# Patient Record
Sex: FEMALE | URBAN - METROPOLITAN AREA
[De-identification: names, ages, dates, MRNs, and addresses within clinical notes are randomized per-mention and may not be internally consistent; named-entity substitution may affect disease eponyms.]

---

## 2018-09-26 ENCOUNTER — APPOINTMENT (RX ONLY)
Dept: URBAN - METROPOLITAN AREA CLINIC 12 | Facility: CLINIC | Age: 83
Setting detail: DERMATOLOGY
End: 2018-09-26

## 2018-09-26 DIAGNOSIS — L82.1 OTHER SEBORRHEIC KERATOSIS: ICD-10-CM

## 2018-09-26 DIAGNOSIS — L21.8 OTHER SEBORRHEIC DERMATITIS: ICD-10-CM

## 2018-09-26 PROBLEM — D48.5 NEOPLASM OF UNCERTAIN BEHAVIOR OF SKIN: Status: ACTIVE | Noted: 2018-09-26

## 2018-09-26 PROCEDURE — 99202 OFFICE O/P NEW SF 15 MIN: CPT | Mod: 25

## 2018-09-26 PROCEDURE — ? BIOPSY BY SHAVE METHOD AND DESTRUCTION

## 2018-09-26 PROCEDURE — 11100: CPT

## 2018-09-26 PROCEDURE — ? PRESCRIPTION

## 2018-09-26 PROCEDURE — ? COUNSELING

## 2018-09-26 RX ORDER — KETOCONAZOLE 20.5 MG/ML
SHAMPOO, SUSPENSION TOPICAL BIW
Qty: 1 | Refills: 3 | Status: ERX | COMMUNITY
Start: 2018-09-26

## 2018-09-26 RX ADMIN — KETOCONAZOLE: 20.5 SHAMPOO, SUSPENSION TOPICAL at 17:44

## 2018-09-26 ASSESSMENT — SEVERITY ASSESSMENT: HOW SEVERE IS THIS PATIENT'S CONDITION?: MODERATE

## 2018-09-26 ASSESSMENT — LOCATION DETAILED DESCRIPTION DERM: LOCATION DETAILED: SUPERIOR THORACIC SPINE

## 2018-09-26 ASSESSMENT — LOCATION SIMPLE DESCRIPTION DERM: LOCATION SIMPLE: UPPER BACK

## 2018-09-26 ASSESSMENT — LOCATION ZONE DERM: LOCATION ZONE: TRUNK

## 2018-09-26 NOTE — PROCEDURE: BIOPSY BY SHAVE METHOD AND DESTRUCTION
Notification Instructions: Patient will be notified of biopsy results. However, patient instructed to call the office if not contacted within 2 weeks.
Bill As?: Biopsy by Shave Method
Number Of Curettages: 3
Size Of Lesion After Curettage: 0
Bill For Surgical Tray: no
Anesthesia Volume In Cc: 1
Path Notes (To The Dermatopathologist): r/o Bcc
Detail Level: Detailed
Anesthesia Type: 1% lidocaine with epinephrine
Post-Care Instructions: I reviewed with the patient in detail post-care instructions. Patient is to keep the biopsy site dry overnight, use saline soaks twice daily for 2 weeks then daily until healed.   Apply Aquaphor twice daily after wound care and cover with dressing.  Written instructions were provided to patient.
Destruction Type: electrodesiccation
Biopsy Type: H and E
Consent: Written consent was obtained and risks were reviewed including but not limited to scarring, infection, bleeding, scabbing, incomplete removal, nerve damage and allergy to anesthesia.
Wound Care: Aquaphor
Billing Type: Third-Party Bill
Lab: [id number]
Hemostasis: Electrocautery

## 2018-10-24 ENCOUNTER — APPOINTMENT (RX ONLY)
Dept: URBAN - METROPOLITAN AREA CLINIC 12 | Facility: CLINIC | Age: 83
Setting detail: DERMATOLOGY
End: 2018-10-24

## 2018-10-24 DIAGNOSIS — L82.0 INFLAMED SEBORRHEIC KERATOSIS: ICD-10-CM

## 2018-10-24 DIAGNOSIS — Z85.828 PERSONAL HISTORY OF OTHER MALIGNANT NEOPLASM OF SKIN: ICD-10-CM

## 2018-10-24 DIAGNOSIS — L91.8 OTHER HYPERTROPHIC DISORDERS OF THE SKIN: ICD-10-CM

## 2018-10-24 PROBLEM — L29.8 OTHER PRURITUS: Status: ACTIVE | Noted: 2018-10-24

## 2018-10-24 PROBLEM — M12.9 ARTHROPATHY, UNSPECIFIED: Status: ACTIVE | Noted: 2018-10-24

## 2018-10-24 PROBLEM — E78.5 HYPERLIPIDEMIA, UNSPECIFIED: Status: ACTIVE | Noted: 2018-10-24

## 2018-10-24 PROBLEM — H91.90 UNSPECIFIED HEARING LOSS, UNSPECIFIED EAR: Status: ACTIVE | Noted: 2018-10-24

## 2018-10-24 PROBLEM — I10 ESSENTIAL (PRIMARY) HYPERTENSION: Status: ACTIVE | Noted: 2018-10-24

## 2018-10-24 PROCEDURE — 11100: CPT | Mod: 59

## 2018-10-24 PROCEDURE — ? BIOPSY BY SHAVE METHOD

## 2018-10-24 PROCEDURE — 17110 DESTRUCTION B9 LES UP TO 14: CPT

## 2018-10-24 PROCEDURE — ? BENIGN DESTRUCTION

## 2018-10-24 ASSESSMENT — LOCATION DETAILED DESCRIPTION DERM
LOCATION DETAILED: RIGHT SUPERIOR FRONTAL SCALP
LOCATION DETAILED: RIGHT CENTRAL FRONTAL SCALP
LOCATION DETAILED: RIGHT PROXIMAL POSTERIOR THIGH

## 2018-10-24 ASSESSMENT — LOCATION SIMPLE DESCRIPTION DERM
LOCATION SIMPLE: SCALP
LOCATION SIMPLE: RIGHT POSTERIOR THIGH
LOCATION SIMPLE: SCALP

## 2018-10-24 ASSESSMENT — LOCATION ZONE DERM
LOCATION ZONE: SCALP
LOCATION ZONE: SCALP
LOCATION ZONE: LEG

## 2018-10-24 NOTE — PROCEDURE: BIOPSY BY SHAVE METHOD
Destruction After The Procedure: No
Additional Anesthesia Volume In Cc (Will Not Render If 0): 0
Dressing: bandage
Wound Care: Aquaphor
Post-Care Instructions: I reviewed with the patient in detail post-care instructions. Patient is to keep the biopsy site dry overnight, then perform saline soaks twice a day for one week then once a day until healed. Apply Aquaphor after saline soak and cover with dressing.  Written instructions were provided to patient for wound care.
Billing Type: Third-Party Bill
Consent: Written consent was obtained and risks were reviewed including but not limited to scarring, infection, bleeding, scabbing, incomplete removal, nerve damage and allergy to anesthesia.
Electrodesiccation And Curettage Text: The wound bed was treated with electrodesiccation and curettage after the biopsy was performed.
Path Notes (To The Dermatopathologist): R/O seb ker
Detail Level: Detailed
Lab: -[id number]
Was A Bandage Applied: Yes
Biopsy Method: Dermablade
Anesthesia Type: 1% lidocaine with epinephrine
Notification Instructions: Patient will be notified of biopsy results. However, patient instructed to call the office if not contacted within 2 weeks.
Cryotherapy Text: The wound bed was treated with cryotherapy after the biopsy was performed.
Curettage Text: The wound bed was treated with curettage after the biopsy was performed.
Electrodesiccation Text: The wound bed was treated with electrodesiccation after the biopsy was performed.
Silver Nitrate Text: The wound bed was treated with silver nitrate after the biopsy was performed.
Biopsy Type: H and E
Anesthesia Volume In Cc (Will Not Render If 0): 0.5
Depth Of Biopsy: dermis
Type Of Destruction Used: Curettage
Hemostasis: Electrocautery

## 2018-10-24 NOTE — PROCEDURE: BENIGN DESTRUCTION
Medical Necessity Clause: This procedure was medically necessary because the lesions that were treated were: irritated and/or painful or catching on clothing.
Render Post-Care Instructions In Note?: no
Anesthesia Volume In Cc: 0.5
Detail Level: Detailed
Treatment Number (Will Not Render If 0): 0
Medical Necessity Information: It is in your best interest to select a reason for this procedure from the list below. All of these items fulfill various CMS LCD requirements except the new and changing color options.
Consent: The patient's consent was obtained including but not limited to risks of crusting, scabbing, blistering, scarring, darker or lighter pigmentary change, recurrence, incomplete removal and infection.
Post-Care Instructions: I reviewed with the patient in detail post-care instructions. Patient is to wear sunprotection, and avoid picking at any of the treated lesions. Pt may apply Aquaphore to crusted or scabbing areas.

## 2018-10-24 NOTE — HPI: SKIN LESION
What Type Of Note Output Would You Prefer (Optional)?: Standard Output
How Severe Is Your Skin Lesion?: moderate
Has Your Skin Lesion Been Treated?: not been treated
Is This A New Presentation, Or A Follow-Up?: Growth
Additional History: Pt. Had recent bcc on scalp that was c&e. Refusing skin check at this time.